# Patient Record
Sex: FEMALE | Race: WHITE | URBAN - METROPOLITAN AREA
[De-identification: names, ages, dates, MRNs, and addresses within clinical notes are randomized per-mention and may not be internally consistent; named-entity substitution may affect disease eponyms.]

---

## 2018-05-18 ENCOUNTER — OFFICE VISIT (OUTPATIENT)
Dept: OBGYN CLINIC | Facility: CLINIC | Age: 38
End: 2018-05-18
Payer: COMMERCIAL

## 2018-05-18 VITALS
HEIGHT: 69 IN | BODY MASS INDEX: 18.46 KG/M2 | WEIGHT: 124.6 LBS | SYSTOLIC BLOOD PRESSURE: 114 MMHG | DIASTOLIC BLOOD PRESSURE: 77 MMHG | HEART RATE: 71 BPM

## 2018-05-18 DIAGNOSIS — R87.610 ASCUS WITH POSITIVE HIGH RISK HPV CERVICAL: Primary | ICD-10-CM

## 2018-05-18 DIAGNOSIS — R87.810 ASCUS WITH POSITIVE HIGH RISK HPV CERVICAL: Primary | ICD-10-CM

## 2018-05-18 PROCEDURE — 99203 OFFICE O/P NEW LOW 30 MIN: CPT | Performed by: NURSE PRACTITIONER

## 2018-05-18 NOTE — PROGRESS NOTES
Assessment/Plan:         Diagnoses and all orders for this visit:    ASCUS with positive high risk HPV cervical      Pt needs colposcopy, last abnormal pap was in   Colpo procedure reviewed   Has appt scheduled 2018    Subjective:      Patient ID: Gus Douglas is a 40 y o  female  HPI 41 yo  here to establish care for a needed colposcopy  Pt had a pap done on 2018 in [place], [place] that was ASCUS with positive HR HPV  She has been traveling/living  In  [place] and [place]  She has had an abnormal pap in  , she does not know what type but just needed repeats and follow ups that were negative  She denies any surgeries to her cervix  She currently is not on contraception  Her LMP was 2018 and previous to that was 2018, menses last typically 5-7 days  She has been seen recently for anxiety and was told by her doctor to follow up with Psychology and she states she will  Colposcopy procedure reviewed and pt aware due to previous colpo  Reviewed will need UPT prior to procedure  The following portions of the patient's history were reviewed and updated as appropriate: allergies, current medications, past family history, past medical history, past social history, past surgical history and problem list     Review of Systems   Respiratory: Negative  Cardiovascular: Negative  Genitourinary: Negative for menstrual problem and vaginal bleeding  LMP 2018  Objective:      /77 (BP Location: Right arm, Patient Position: Sitting, Cuff Size: Adult)   Pulse 71   Ht 5' 9" (1 753 m)   Wt 56 5 kg (124 lb 9 6 oz)   LMP 2018 (Exact Date)   Breastfeeding? No   BMI 18 40 kg/m²          Physical Exam   Constitutional: She appears well-nourished  Pulmonary/Chest: Effort normal      Tearful when reviewing pap results

## 2018-05-31 ENCOUNTER — PROCEDURE VISIT (OUTPATIENT)
Dept: OBGYN CLINIC | Facility: CLINIC | Age: 38
End: 2018-05-31
Payer: COMMERCIAL

## 2018-05-31 VITALS
SYSTOLIC BLOOD PRESSURE: 102 MMHG | BODY MASS INDEX: 18.46 KG/M2 | HEART RATE: 62 BPM | DIASTOLIC BLOOD PRESSURE: 66 MMHG | WEIGHT: 121.8 LBS | HEIGHT: 68 IN

## 2018-05-31 DIAGNOSIS — R87.810 ASCUS WITH POSITIVE HIGH RISK HPV CERVICAL: ICD-10-CM

## 2018-05-31 DIAGNOSIS — R87.610 ASCUS WITH POSITIVE HIGH RISK HPV CERVICAL: ICD-10-CM

## 2018-05-31 DIAGNOSIS — R87.619 ABNORMAL CERVICAL PAPANICOLAOU SMEAR, UNSPECIFIED ABNORMAL PAP FINDING: Primary | ICD-10-CM

## 2018-05-31 LAB — SL AMB POCT URINE HCG: NORMAL

## 2018-05-31 PROCEDURE — 58110 BX DONE W/COLPOSCOPY ADD-ON: CPT | Performed by: OBSTETRICS & GYNECOLOGY

## 2018-05-31 PROCEDURE — 88305 TISSUE EXAM BY PATHOLOGIST: CPT | Performed by: PATHOLOGY

## 2018-05-31 PROCEDURE — 57456 ENDOCERV CURETTAGE W/SCOPE: CPT | Performed by: OBSTETRICS & GYNECOLOGY

## 2018-05-31 PROCEDURE — 81025 URINE PREGNANCY TEST: CPT | Performed by: OBSTETRICS & GYNECOLOGY

## 2018-05-31 NOTE — PROGRESS NOTES
Colposcopy  Date/Time: 5/31/2018 10:34 AM  Performed by: Sima Mccauley by: Jay Ahn     Consent:     Consent obtained:  Written    Consent given by:  Patient    Procedural risks discussed:  Bleeding, infection and repeat procedure    Patient questions answered: yes      Patient agrees, verbalizes understanding, and wants to proceed: yes      Instructions and paperwork completed: yes    Pre-procedure:     Prepped with: acetic acid    Indication:     Indication:  ASC-US  Procedure:     Procedure: Colposcopy w/ endocervical curettage      Under satisfactory analgesia the patient was prepped and draped in the dorsal lithotomy position: yes      [place] speculum was placed in the vagina: yes      Under colposcopic examination the transition zone was seen in entirety: no      Endocervix was curetted using a Kevorkian curette: yes      Specimen to pathology: yes    Post-procedure:     Impression: Low grade cervical dysplasia      Patient tolerance of procedure: Tolerated well, no immediate complications  Comments:      Was at the tail end of menses / spotting  Mild aceto-white changes indicative of low grade dysplasia  Unsatisfactory Colposcopy as transition zone was not entirely seen  ECC performed  Will call pt with results

## 2018-05-31 NOTE — PROGRESS NOTES
Pt here for colpo, pregnancy test done in office today and is negative  Pt says she is spotting today, her period is due to end tomorrow

## 2018-06-08 ENCOUNTER — TELEPHONE (OUTPATIENT)
Dept: OBGYN CLINIC | Facility: CLINIC | Age: 38
End: 2018-06-08

## 2018-06-08 NOTE — TELEPHONE ENCOUNTER
Pt called, wants results of ECC that was done from 5/31/18  Per chart review, pt was going to be notified of results of  [facility] via telephone (pt says she is traveling)  Reviewed pt's results with Dr Syed Floyd is negative and pt needs f/u Pap in 1 yr  Pt made aware  Explained ASCUS Pap result and told pt that results of her ECC showed that abnormal cells from Pap were not significant

## 2018-06-10 NOTE — TELEPHONE ENCOUNTER
Thanks for keeping me in the loop [facility]  It was a pleasure working with you  However I'm no longer doing the Ob/gyn rotation  So any further results can be handled by the current OB/gyn resident (who I believe is Dr Sugar Quezada)